# Patient Record
Sex: FEMALE | Race: WHITE
[De-identification: names, ages, dates, MRNs, and addresses within clinical notes are randomized per-mention and may not be internally consistent; named-entity substitution may affect disease eponyms.]

---

## 2019-12-02 ENCOUNTER — HOSPITAL ENCOUNTER (OUTPATIENT)
Dept: HOSPITAL 92 - SDC | Age: 64
Discharge: HOME | End: 2019-12-02
Attending: PLASTIC SURGERY
Payer: MEDICARE

## 2019-12-02 VITALS — BODY MASS INDEX: 25.4 KG/M2

## 2019-12-02 DIAGNOSIS — E78.5: ICD-10-CM

## 2019-12-02 DIAGNOSIS — Z79.811: ICD-10-CM

## 2019-12-02 DIAGNOSIS — C50.912: Primary | ICD-10-CM

## 2019-12-02 DIAGNOSIS — E66.3: ICD-10-CM

## 2019-12-02 DIAGNOSIS — Z79.2: ICD-10-CM

## 2019-12-02 DIAGNOSIS — E03.9: ICD-10-CM

## 2019-12-02 DIAGNOSIS — Z79.899: ICD-10-CM

## 2019-12-02 PROCEDURE — 93010 ELECTROCARDIOGRAM REPORT: CPT

## 2019-12-02 PROCEDURE — 93005 ELECTROCARDIOGRAM TRACING: CPT

## 2019-12-02 PROCEDURE — 19357 TISS XPNDR PLMT BRST RCNSTJ: CPT

## 2019-12-02 PROCEDURE — C1713 ANCHOR/SCREW BN/BN,TIS/BN: HCPCS

## 2019-12-02 PROCEDURE — S0020 INJECTION, BUPIVICAINE HYDRO: HCPCS

## 2019-12-02 PROCEDURE — 0HHU0NZ INSERTION OF TISSUE EXPANDER INTO LEFT BREAST, OPEN APPROACH: ICD-10-PCS | Performed by: PLASTIC SURGERY

## 2019-12-02 PROCEDURE — A4306 DRUG DELIVERY SYSTEM <=50 ML: HCPCS

## 2019-12-02 NOTE — OP
DATE OF PROCEDURE:  12/02/2019



PREOPERATIVE DIAGNOSIS:  Left breast cancer.



POSTOPERATIVE DIAGNOSIS:  Left breast cancer.



PROCEDURE PERFORMED:  Placement of left tissue expander for breast reconstruction

(06119). 



OPERATIVE FINDINGS:  Left breast tissue expander, reference #[id number], serial

#[id number] filled to a volume of 500 mL. 



DESCRIPTION OF PROCEDURE:  Following induction of adequate anesthesia, the patient

was prepped and draped in usual sterile fashion in the supine position.  The

existing inframammary crease scar was incised.  Dissection was carried sharply down

through the subcutaneous tissue to identify the pectoralis fascia.  Subsequently, an

adequate prepectoral pocket was created.  Pocket was copiously irrigated and

inspected with triple antibiotic solution and dilute Betadine solution prior to

placement of antibiotic beads and expander.  The expander was secured in the proper

position using interrupted 2-0 Prolene suture at the suture tabs.  The incision was

then closed in layers using 2-0 and 3-0 PDS suture for the deeper layers followed by

4-0 Monocryl for the skin. 







Job ID:  [id number]

## 2020-05-13 ENCOUNTER — HOSPITAL ENCOUNTER (OUTPATIENT)
Dept: HOSPITAL 92 - LABBT | Age: 65
End: 2020-05-13
Attending: PLASTIC SURGERY
Payer: MEDICARE

## 2020-05-13 DIAGNOSIS — Z85.3: ICD-10-CM

## 2020-05-13 DIAGNOSIS — Z11.59: ICD-10-CM

## 2020-05-13 DIAGNOSIS — Z01.812: Primary | ICD-10-CM

## 2020-05-13 PROCEDURE — U0003 INFECTIOUS AGENT DETECTION BY NUCLEIC ACID (DNA OR RNA); SEVERE ACUTE RESPIRATORY SYNDROME CORONAVIRUS 2 (SARS-COV-2) (CORONAVIRUS DISEASE [COVID-19]), AMPLIFIED PROBE TECHNIQUE, MAKING USE OF HIGH THROUGHPUT TECHNOLOGIES AS DESCRIBED BY CMS-2020-01-R: HCPCS

## 2020-05-13 PROCEDURE — 87635 SARS-COV-2 COVID-19 AMP PRB: CPT

## 2020-05-18 ENCOUNTER — HOSPITAL ENCOUNTER (OUTPATIENT)
Dept: HOSPITAL 92 - SDC | Age: 65
Discharge: HOME | End: 2020-05-18
Attending: PLASTIC SURGERY
Payer: MEDICARE

## 2020-05-18 VITALS — BODY MASS INDEX: 26.6 KG/M2

## 2020-05-18 DIAGNOSIS — E07.9: ICD-10-CM

## 2020-05-18 DIAGNOSIS — Z79.899: ICD-10-CM

## 2020-05-18 DIAGNOSIS — Z79.811: ICD-10-CM

## 2020-05-18 DIAGNOSIS — C50.912: Primary | ICD-10-CM

## 2020-05-18 DIAGNOSIS — Z79.2: ICD-10-CM

## 2020-05-18 PROCEDURE — 0HRU37Z REPLACEMENT OF LEFT BREAST WITH AUTOLOGOUS TISSUE SUBSTITUTE, PERCUTANEOUS APPROACH: ICD-10-PCS | Performed by: PLASTIC SURGERY

## 2020-05-18 PROCEDURE — S0020 INJECTION, BUPIVICAINE HYDRO: HCPCS

## 2020-05-18 PROCEDURE — C1789 PROSTHESIS, BREAST, IMP: HCPCS

## 2020-05-18 PROCEDURE — 0HRU0JZ REPLACEMENT OF LEFT BREAST WITH SYNTHETIC SUBSTITUTE, OPEN APPROACH: ICD-10-PCS | Performed by: PLASTIC SURGERY

## 2020-05-18 PROCEDURE — 0HPU0JZ REMOVAL OF SYNTHETIC SUBSTITUTE FROM LEFT BREAST, OPEN APPROACH: ICD-10-PCS | Performed by: PLASTIC SURGERY

## 2020-05-18 PROCEDURE — 15772 GRFG AUTOL FAT LIPO EA ADDL: CPT

## 2020-05-18 PROCEDURE — 19342 INSJ/RPLCMT BRST IMPLT SEP D: CPT

## 2020-05-18 PROCEDURE — 15771 GRFG AUTOL FAT LIPO 50 CC/<: CPT

## 2020-05-19 NOTE — OP
DATE OF PROCEDURE:  05/18/2020



PREOPERATIVE DIAGNOSIS:  Left breast cancer.



POSTOPERATIVE DIAGNOSIS:  Left breast cancer.



PROCEDURES PERFORMED:  

1. Replacement of left tissue expander with capsular work (84900).

2. Fat grafting, left breast (70 mL) (34647, 23235).



DESCRIPTION OF PROCEDURE:  Following the induction of adequate anesthesia, the

patient was prepped and draped in the usual sterile fashion in supine position.  The

inframammary crease scar incision was made.  Dissection was carried sharply down

through the subcutaneous tissue to the underlying implant capsule, which was

incised.  The existing expander was deflated and removed.  The pocket was opened

superiorly.  A sizer was used to determine what implant would give the best

symmetry.  A [place] (reference number [id number], serial number [id number]) was

chosen.  It was placed after copious irrigation with antibiotic and dilute Betadine

solution.  The pocket was then closed with 3-0 PDS suture and 3-0 Monocryl suture. 



Attention was turned to the fat grafting.  The abdomen was infiltrated.  After the

tumescent fluid had adequate time to take effect, a traditional liposuction was done

with harvesting the fat.  The fat was then injected in the subcutaneous plane using

a micro-droplet technique over the inner as well as the upper two quadrants of the

breast.  70 mL of fat was injected in total.  This was done through a remote stab

incision.  All stab incisions were closed with 5-0 fast gut suture.  The patient

tolerated the procedure well. 







Job ID:  [id number]

## 2021-09-16 ENCOUNTER — HOSPITAL ENCOUNTER (OUTPATIENT)
Dept: HOSPITAL 92 - BICMRI | Age: 66
Discharge: HOME | End: 2021-09-16
Attending: INTERNAL MEDICINE
Payer: MEDICARE

## 2021-09-16 DIAGNOSIS — C50.112: Primary | ICD-10-CM

## 2021-09-16 DIAGNOSIS — Z15.01: ICD-10-CM

## 2021-09-16 LAB — ESTIMATED GFR-MDRD - POC: 62

## 2021-09-16 PROCEDURE — 82565 ASSAY OF CREATININE: CPT

## 2021-09-16 PROCEDURE — A9577 INJ MULTIHANCE: HCPCS

## 2021-09-16 PROCEDURE — C8908 MRI W/O FOL W/CONT, BREAST,: HCPCS

## 2022-10-04 ENCOUNTER — HOSPITAL ENCOUNTER (OUTPATIENT)
Dept: HOSPITAL 92 - CSHMRI | Age: 67
Discharge: HOME | End: 2022-10-04
Attending: INTERNAL MEDICINE
Payer: MEDICARE

## 2022-10-04 DIAGNOSIS — Z15.01: ICD-10-CM

## 2022-10-04 DIAGNOSIS — Z85.3: ICD-10-CM

## 2022-10-04 DIAGNOSIS — M85.80: Primary | ICD-10-CM

## 2022-10-04 LAB — EGFRCR SERPLBLD CKD-EPI 2021: 94 ML/MIN/{1.73_M2}

## 2022-10-04 PROCEDURE — 82565 ASSAY OF CREATININE: CPT

## 2022-10-04 PROCEDURE — C8908 MRI W/O FOL W/CONT, BREAST,: HCPCS

## 2022-10-13 ENCOUNTER — HOSPITAL ENCOUNTER (OUTPATIENT)
Dept: HOSPITAL 92 - CSHMAMMO | Age: 67
Discharge: HOME | End: 2022-10-13
Attending: INTERNAL MEDICINE
Payer: MEDICARE

## 2022-10-13 DIAGNOSIS — M85.89: Primary | ICD-10-CM

## 2022-10-13 DIAGNOSIS — T38.6X5A: ICD-10-CM

## 2022-10-13 PROCEDURE — 77080 DXA BONE DENSITY AXIAL: CPT

## 2023-02-17 ENCOUNTER — HOSPITAL ENCOUNTER (OUTPATIENT)
Dept: HOSPITAL 92 - SDC | Age: 68
Discharge: HOME | End: 2023-02-17
Attending: PLASTIC SURGERY
Payer: MEDICARE

## 2023-02-17 VITALS — BODY MASS INDEX: 28.3 KG/M2

## 2023-02-17 DIAGNOSIS — Z79.2: ICD-10-CM

## 2023-02-17 DIAGNOSIS — N65.1: Primary | ICD-10-CM

## 2023-02-17 DIAGNOSIS — Z85.3: ICD-10-CM

## 2023-02-17 DIAGNOSIS — E07.9: ICD-10-CM

## 2023-02-17 DIAGNOSIS — Z79.890: ICD-10-CM

## 2023-02-17 DIAGNOSIS — Z79.899: ICD-10-CM

## 2023-02-17 PROCEDURE — 88305 TISSUE EXAM BY PATHOLOGIST: CPT

## 2023-02-17 PROCEDURE — 0HBT0ZZ EXCISION OF RIGHT BREAST, OPEN APPROACH: ICD-10-PCS | Performed by: PLASTIC SURGERY

## 2023-07-07 ENCOUNTER — HOSPITAL ENCOUNTER (OUTPATIENT)
Dept: HOSPITAL 92 - CSHULT | Age: 68
Discharge: HOME | End: 2023-07-07
Attending: PLASTIC SURGERY
Payer: MEDICARE

## 2023-07-07 DIAGNOSIS — N63.22: Primary | ICD-10-CM

## 2023-07-07 DIAGNOSIS — Z85.3: ICD-10-CM

## 2023-09-19 ENCOUNTER — HOSPITAL ENCOUNTER (OUTPATIENT)
Dept: HOSPITAL 92 - CSHULT | Age: 68
Discharge: HOME | End: 2023-09-19
Attending: PLASTIC SURGERY
Payer: MEDICARE

## 2023-09-19 DIAGNOSIS — Z85.3: ICD-10-CM

## 2023-09-19 DIAGNOSIS — N63.22: Primary | ICD-10-CM

## 2023-09-19 PROCEDURE — 77065 DX MAMMO INCL CAD UNI: CPT

## 2023-09-19 PROCEDURE — G0279 TOMOSYNTHESIS, MAMMO: HCPCS

## 2023-09-19 PROCEDURE — 76642 ULTRASOUND BREAST LIMITED: CPT

## 2023-10-18 ENCOUNTER — HOSPITAL ENCOUNTER (OUTPATIENT)
Dept: HOSPITAL 92 - BICMAMMO | Age: 68
Discharge: HOME | End: 2023-10-18
Attending: INTERNAL MEDICINE
Payer: MEDICARE

## 2023-10-18 DIAGNOSIS — N63.21: ICD-10-CM

## 2023-10-18 DIAGNOSIS — C50.112: Primary | ICD-10-CM

## 2023-10-18 DIAGNOSIS — Z14.8: ICD-10-CM

## 2023-10-18 LAB — EGFRCR SERPLBLD CKD-EPI 2021: 69 ML/MIN/{1.73_M2}

## 2023-10-18 PROCEDURE — C8908 MRI W/O FOL W/CONT, BREAST,: HCPCS

## 2023-10-18 PROCEDURE — 82565 ASSAY OF CREATININE: CPT

## 2023-10-18 PROCEDURE — A9577 INJ MULTIHANCE: HCPCS

## 2023-10-18 PROCEDURE — 77065 DX MAMMO INCL CAD UNI: CPT

## 2023-10-18 PROCEDURE — G0279 TOMOSYNTHESIS, MAMMO: HCPCS
